# Patient Record
Sex: FEMALE | Race: WHITE | Employment: UNEMPLOYED | URBAN - NONMETROPOLITAN AREA
[De-identification: names, ages, dates, MRNs, and addresses within clinical notes are randomized per-mention and may not be internally consistent; named-entity substitution may affect disease eponyms.]

---

## 2023-10-15 ENCOUNTER — HOSPITAL ENCOUNTER (EMERGENCY)
Age: 63
Discharge: HOME | End: 2023-10-15
Attending: STUDENT IN AN ORGANIZED HEALTH CARE EDUCATION/TRAINING PROGRAM
Payer: COMMERCIAL

## 2023-10-15 ENCOUNTER — APPOINTMENT (OUTPATIENT)
Dept: CT IMAGING | Age: 63
End: 2023-10-15
Payer: COMMERCIAL

## 2023-10-15 ENCOUNTER — APPOINTMENT (OUTPATIENT)
Dept: GENERAL RADIOLOGY | Age: 63
End: 2023-10-15
Payer: COMMERCIAL

## 2023-10-15 VITALS
RESPIRATION RATE: 19 BRPM | TEMPERATURE: 98.1 F | SYSTOLIC BLOOD PRESSURE: 132 MMHG | OXYGEN SATURATION: 91 % | DIASTOLIC BLOOD PRESSURE: 64 MMHG | WEIGHT: 250 LBS | BODY MASS INDEX: 40.18 KG/M2 | HEART RATE: 91 BPM | HEIGHT: 66 IN

## 2023-10-15 DIAGNOSIS — F41.1 ANXIETY STATE: Primary | ICD-10-CM

## 2023-10-15 LAB
ANION GAP SERPL CALC-SCNC: 11 MEQ/L (ref 8–16)
BASOPHILS ABSOLUTE: 0 THOU/MM3 (ref 0–0.1)
BASOPHILS NFR BLD AUTO: 0.8 %
BUN SERPL-MCNC: 17 MG/DL (ref 7–22)
CALCIUM SERPL-MCNC: 9.6 MG/DL (ref 8.5–10.5)
CHLORIDE SERPL-SCNC: 100 MEQ/L (ref 98–111)
CO2 SERPL-SCNC: 25 MEQ/L (ref 23–33)
CREAT SERPL-MCNC: 0.6 MG/DL (ref 0.4–1.2)
DEPRECATED RDW RBC AUTO: 41.6 FL (ref 35–45)
EOSINOPHIL NFR BLD AUTO: 2.2 %
EOSINOPHILS ABSOLUTE: 0.1 THOU/MM3 (ref 0–0.4)
ERYTHROCYTE [DISTWIDTH] IN BLOOD BY AUTOMATED COUNT: 12.7 % (ref 11.5–14.5)
GFR SERPL CREATININE-BSD FRML MDRD: > 60 ML/MIN/1.73M2
GLUCOSE SERPL-MCNC: 93 MG/DL (ref 70–108)
HCT VFR BLD AUTO: 46.8 % (ref 37–47)
HGB BLD-MCNC: 15.2 GM/DL (ref 12–16)
IMM GRANULOCYTES # BLD AUTO: 0.02 THOU/MM3 (ref 0–0.07)
IMM GRANULOCYTES NFR BLD AUTO: 0.3 %
LYMPHOCYTES ABSOLUTE: 1.1 THOU/MM3 (ref 1–4.8)
LYMPHOCYTES NFR BLD AUTO: 18.1 %
MCH RBC QN AUTO: 29.3 PG (ref 26–33)
MCHC RBC AUTO-ENTMCNC: 32.5 GM/DL (ref 32.2–35.5)
MCV RBC AUTO: 90.3 FL (ref 81–99)
MONOCYTES ABSOLUTE: 0.4 THOU/MM3 (ref 0.4–1.3)
MONOCYTES NFR BLD AUTO: 6.6 %
NEUTROPHILS NFR BLD AUTO: 72 %
NRBC BLD AUTO-RTO: 0 /100 WBC
OSMOLALITY SERPL CALC.SUM OF ELEC: 273.2 MOSMOL/KG (ref 275–300)
PLATELET # BLD AUTO: 218 THOU/MM3 (ref 130–400)
PMV BLD AUTO: 10.1 FL (ref 9.4–12.4)
POTASSIUM SERPL-SCNC: 4.3 MEQ/L (ref 3.5–5.2)
RBC # BLD AUTO: 5.18 MILL/MM3 (ref 4.2–5.4)
SEGMENTED NEUTROPHILS ABSOLUTE COUNT: 4.3 THOU/MM3 (ref 1.8–7.7)
SODIUM SERPL-SCNC: 136 MEQ/L (ref 135–145)
TROPONIN, HIGH SENSITIVITY: < 6 NG/L (ref 0–12)
TSH SERPL DL<=0.005 MIU/L-ACNC: 3.1 UIU/ML (ref 0.4–4.2)
WBC # BLD AUTO: 6 THOU/MM3 (ref 4.8–10.8)

## 2023-10-15 PROCEDURE — 84443 ASSAY THYROID STIM HORMONE: CPT

## 2023-10-15 PROCEDURE — 93005 ELECTROCARDIOGRAM TRACING: CPT | Performed by: STUDENT IN AN ORGANIZED HEALTH CARE EDUCATION/TRAINING PROGRAM

## 2023-10-15 PROCEDURE — 85025 COMPLETE CBC W/AUTO DIFF WBC: CPT

## 2023-10-15 PROCEDURE — 36415 COLL VENOUS BLD VENIPUNCTURE: CPT

## 2023-10-15 PROCEDURE — 6360000004 HC RX CONTRAST MEDICATION: Performed by: STUDENT IN AN ORGANIZED HEALTH CARE EDUCATION/TRAINING PROGRAM

## 2023-10-15 PROCEDURE — 99285 EMERGENCY DEPT VISIT HI MDM: CPT

## 2023-10-15 PROCEDURE — 84484 ASSAY OF TROPONIN QUANT: CPT

## 2023-10-15 PROCEDURE — 71260 CT THORAX DX C+: CPT

## 2023-10-15 PROCEDURE — 71045 X-RAY EXAM CHEST 1 VIEW: CPT

## 2023-10-15 PROCEDURE — 6370000000 HC RX 637 (ALT 250 FOR IP): Performed by: STUDENT IN AN ORGANIZED HEALTH CARE EDUCATION/TRAINING PROGRAM

## 2023-10-15 PROCEDURE — 80048 BASIC METABOLIC PNL TOTAL CA: CPT

## 2023-10-15 RX ORDER — LORAZEPAM 1 MG/1
1 TABLET ORAL ONCE
Status: COMPLETED | OUTPATIENT
Start: 2023-10-15 | End: 2023-10-15

## 2023-10-15 RX ORDER — TRIAMTERENE AND HYDROCHLOROTHIAZIDE 37.5; 25 MG/1; MG/1
1 CAPSULE ORAL EVERY MORNING
COMMUNITY

## 2023-10-15 RX ORDER — HYDROXYZINE HYDROCHLORIDE 25 MG/1
25 TABLET, FILM COATED ORAL 3 TIMES DAILY PRN
COMMUNITY

## 2023-10-15 RX ORDER — TRAZODONE HYDROCHLORIDE 100 MG/1
100 TABLET ORAL NIGHTLY
COMMUNITY

## 2023-10-15 RX ADMIN — LORAZEPAM 1 MG: 1 TABLET ORAL at 11:59

## 2023-10-15 RX ADMIN — IOPAMIDOL 80 ML: 755 INJECTION, SOLUTION INTRAVENOUS at 13:57

## 2023-10-15 ASSESSMENT — PAIN - FUNCTIONAL ASSESSMENT: PAIN_FUNCTIONAL_ASSESSMENT: NONE - DENIES PAIN

## 2023-10-15 NOTE — ED NOTES
Pt rprts feeling a bit better following medications; \"less jittery. \" Pt to and from x-ray w/rad tech. Placed back on the monitor. Vitals updated. Call light within reach.       Yomi Singh, [address] [place] [address]  10/15/23 [id number]

## 2023-10-15 NOTE — ED NOTES
Pt to and from bathroom. Gait steady. Pt updated on new orders. Medicated per order. EKG obtained. Pt denies any CP/SOB. Vitals updated. Call light remains within reach.       Diana Summers  10/15/23 [id number]

## 2023-10-15 NOTE — DISCHARGE INSTRUCTIONS
Please continue taking all of your medications as directed. As you reviewed if you have any worsening chest pain, shortness of breath or recurrent symptoms not treated with your home medication do not hesitate to come back to the emergency department.

## 2023-10-15 NOTE — ED TRIAGE NOTES
Pt to ED via private vehicle w/spouse and rprts of waking up feeling anxious and jittery. Rprts forgot to take all meds yesterday, but did take them today. Rprts not realizing that she can take vistaril 3 times a day and has only intermittently been taking once a day. Pt denies chest pain or SOB. Pt A&O x4. Breathing easy and unlabored on RA. Denies SI/HI at this time. Vitals updated. Call light remains within reach.

## 2023-10-15 NOTE — ED PROVIDER NOTES
[facility] EMERGENCY DEPT    EMERGENCY MEDICINE      Pt Name: Lala Godinez  MRN: [id number]  [address] [facility] 1960  Date of evaluation: 10/15/2023  Treating Physician: Kylee Quintanilla DO    CHIEF COMPLAINT       Chief Complaint   Patient presents with    Anxiety     History obtained from chart review and the patient. HISTORY OF PRESENT ILLNESS    HPI    Lala Godinez is a 60 y.o. female presents to the emergency department for evaluation of anxiousness. Patient states she woke up this morning feeling very uneasy and jittery. States history of anxiety for which she takes Vistaril. Has been only taking intermittently at most once per day. Feels a warm sensation and flushing sensation throughout her body. Patient began crying and states that she does not know why she wants to cry but feels like she needs to. Denies any chest pain, change in bowel or bladder habits. When her feelings get bad she does note associated shortness of breath. Denies any history of thyroid abnormalities. Does note that her and family member bedside of recently stopped consuming as much caffeine and cut down her soda intake. The patient has no other acute complaints at this time.       PAST MEDICAL AND SURGICAL HISTORY     Past Medical History:   Diagnosis Date    Diabetes ([address])     Hypertension        Past Surgical History:   Procedure Laterality Date    COLONOSCOPY  2010    [id number] [facility]       CURRENT MEDICATIONS     Discharge Medication List as of 10/15/2023  3:00 PM        CONTINUE these medications which have NOT CHANGED    Details   triamterene-hydroCHLOROthiazide (DYAZIDE) 37.5-25 MG per capsule Take 1 capsule by mouth every morningHistorical Med      hydrOXYzine HCl (ATARAX) 25 MG tablet Take 1 tablet by mouth 3 times daily as needed for ItchingHistorical Med      traZODone (DESYREL) 100 MG tablet Take 1 tablet by mouth nightlyHistorical Med      simvastatin (ZOCOR) 40 MG tablet Take 1

## 2023-10-16 LAB
EKG ATRIAL RATE: 83 BPM
EKG P AXIS: 67 DEGREES
EKG P-R INTERVAL: 172 MS
EKG Q-T INTERVAL: 364 MS
EKG QRS DURATION: 88 MS
EKG QTC CALCULATION (BAZETT): 427 MS
EKG R AXIS: 8 DEGREES
EKG T AXIS: 41 DEGREES
EKG VENTRICULAR RATE: 83 BPM

## 2023-10-16 PROCEDURE — 93010 ELECTROCARDIOGRAM REPORT: CPT | Performed by: INTERNAL MEDICINE

## 2024-09-15 LAB
ADENOVIRUS DNA QUALITITIVE BY RT PCR: NOT DETECTED
BORDETELLA PARAPERTUSSIS BY PCR: NOT DETECTED
BORDETELLA PERTUSSIS PCR: NOT DETECTED
CHLAMYDIA PNEUMONIAE: NOT DETECTED
HCOV 229E RNA SPEC QL NAA+PROBE: NOT DETECTED
HCOV HKU1 RNA SPEC QL NAA+PROBE: NOT DETECTED
HCOV NL63 RNA SPEC QL NAA+PROBE: NOT DETECTED
HCOV OC43 RNA SPEC QL NAA+PROBE: NOT DETECTED
HUMAN METAPNEUMOVIRUS PCR: DETECTED
INFLUENZA A H1 BY PCR: NOT DETECTED
INFLUENZA A H1-2009 BY PCR: NOT DETECTED
INFLUENZA A H3 BY PCR: NOT DETECTED
INFLUENZA VIRUS A RNA: NOT DETECTED
INFLUENZA VIRUS B RNA: NOT DETECTED
MYCOPLASMA PNEUMONIAE PCR: NOT DETECTED
PARAINFLUENZA 1 PCR: NOT DETECTED
PARAINFLUENZA 2 PCR: NOT DETECTED
PARAINFLUENZA 3 PCR: NOT DETECTED
PARAINFLUENZA 4 PCR: NOT DETECTED
RESPIRATORY SYNCYTIAL VIRUS: NOT DETECTED
RV+EV RNA SPEC QL NAA+PROBE: NOT DETECTED
SARS-COV-2: NOT DETECTED

## 2024-09-16 LAB — PROCALCITONIN: 0.07 NG/ML

## 2024-10-13 NOTE — PROGRESS NOTES
[place] [facility]  Pulmonary medicine clinic initial consult note.        Patient: Rain Kumar  : 1960      Chief complaint/[place]:     Rain Kumar is a 63 y.o. old female came for further evaluation regarding her exertional dyspnea started after having human Jackman pneumo virus pneumonia in 2024.  She was initially hospitalized for 3 to 4 days at [facility] and was discharged home.  Due to worsening of her symptoms she came back to the [facility] ER and was transferred to [facility].  She stayed at [facility] 3 to 4 days and was subsequently discharged home.  She was discharged home with a home oxygen at 1 L/min.  She is currently not using any home oxygen.     She was referred by BOO Tracy NP     She was also suspected to have a bronchial asthma/COPD.    She is currently taking following inhalers:  Albuterol HFA 2 puffs every 6 hourly as needed  Singulair 10 mg p.o. nightly  Symbicort 160/4.52 puffs twice daily    She was evaluated at the [facility] emergency room on 2023.    She is having shortness of breath:Yes  Onset: Gradual  Duration: For the last 2 years  Diurnal variation:  None.    Functional status prior to beginning of symptoms: Distance she can walk on level ground: 1 mile on level ground  Current functional capacity on level ground: Distance she can walk on level ground: Less than 1 block on level ground  She can climb steps: Yes  Flights of steps she can climb: 2 steps at home.  She gives a history of orthopnea:Yes.  She is currently using 1 pillow.  She gives a history of paroxysmal nocturnal dyspnea:Yes     She is having cough: No    Associated with fever: No    She was ever diagnosed with following pulmonary diseases:  Bronchial asthma:No.   COPD:No.   Pulmonary fibrosis:No.   Sarcoidosis:No.   Pulmonary embolism: No.   Bronchiectasis:No.

## 2024-10-24 ENCOUNTER — OFFICE VISIT (OUTPATIENT)
Dept: PULMONOLOGY | Age: 64
End: 2024-10-24
Payer: COMMERCIAL

## 2024-10-24 VITALS
BODY MASS INDEX: 45.39 KG/M2 | DIASTOLIC BLOOD PRESSURE: 72 MMHG | SYSTOLIC BLOOD PRESSURE: 142 MMHG | HEART RATE: 84 BPM | TEMPERATURE: 98.1 F | WEIGHT: 289.2 LBS | OXYGEN SATURATION: 92 % | HEIGHT: 67 IN

## 2024-10-24 DIAGNOSIS — R06.01 ORTHOPNEA: ICD-10-CM

## 2024-10-24 DIAGNOSIS — R06.00 DYSPNEA, UNSPECIFIED TYPE: ICD-10-CM

## 2024-10-24 DIAGNOSIS — R93.89 ABNORMAL CT OF THE CHEST: ICD-10-CM

## 2024-10-24 DIAGNOSIS — J84.10 PULMONARY FIBROSIS (HCC): Primary | ICD-10-CM

## 2024-10-24 DIAGNOSIS — G47.30 SLEEP APNEA, UNSPECIFIED TYPE: ICD-10-CM

## 2024-10-24 DIAGNOSIS — R06.00 PND (PAROXYSMAL NOCTURNAL DYSPNEA): ICD-10-CM

## 2024-10-24 DIAGNOSIS — J18.9 PNEUMONIA OF BOTH LOWER LOBES DUE TO INFECTIOUS ORGANISM: ICD-10-CM

## 2024-10-24 PROCEDURE — 99205 OFFICE O/P NEW HI 60 MIN: CPT | Performed by: INTERNAL MEDICINE

## 2024-10-24 PROCEDURE — 3017F COLORECTAL CA SCREEN DOC REV: CPT | Performed by: INTERNAL MEDICINE

## 2024-10-24 PROCEDURE — G8419 CALC BMI OUT NRM PARAM NOF/U: HCPCS | Performed by: INTERNAL MEDICINE

## 2024-10-24 PROCEDURE — 1036F TOBACCO NON-USER: CPT | Performed by: INTERNAL MEDICINE

## 2024-10-24 PROCEDURE — G8484 FLU IMMUNIZE NO ADMIN: HCPCS | Performed by: INTERNAL MEDICINE

## 2024-10-24 PROCEDURE — G8427 DOCREV CUR MEDS BY ELIG CLIN: HCPCS | Performed by: INTERNAL MEDICINE

## 2024-10-24 RX ORDER — BUDESONIDE AND FORMOTEROL FUMARATE DIHYDRATE 160; 4.5 UG/1; UG/1
AEROSOL RESPIRATORY (INHALATION)
COMMUNITY
Start: 2024-09-25

## 2024-10-24 RX ORDER — ALBUTEROL SULFATE 90 UG/1
2 INHALANT RESPIRATORY (INHALATION) EVERY 6 HOURS PRN
COMMUNITY
Start: 2024-09-25

## 2024-10-24 RX ORDER — FLUTICASONE PROPIONATE 50 MCG
2 SPRAY, SUSPENSION (ML) NASAL DAILY
COMMUNITY
Start: 2024-09-25

## 2024-10-24 RX ORDER — ATORVASTATIN CALCIUM 10 MG/1
10 TABLET, FILM COATED ORAL NIGHTLY
COMMUNITY

## 2024-10-24 NOTE — PATIENT INSTRUCTIONS
ecommendations/Plan:  -Schedule patient for full pulmonary function tests before clinic visit.  -Schedule patient for Methacholine challenge test before clinic visit  to further evaluate for hyperreactive air way disease before clinic visit.  -Will send Expectorated sputum for Eosinophils.  -Please schedule patient for follow up at [facility] sleep clinic with earliest available provider for further evaluation and management of sleep apnea.  She underwent a sleep study in 2023 at [facility] sleep lab.  Need to obtain her old sleep study reports for the sleep clinic consultation.  -Schedule patient for Cardiology consult with Dr. Mc Taylor MD as soon as possible for further evaluation of ?  Etiology of exertional dyspnea for the last 2 years to rule out diastolic congestive heart failure as an etiology.  She gave a history of orthopnea and paroxysmal nocturnal dyspnea.  -She was advised to loose weight by controlling diet and doing exercise once cleared by her Cardiologist.  -Rain Kumar was advised to make early appointment with my clinic if she develops any constitutional symptoms including loss of weight, poor appetite or hemoptysis. She verbalizes under standing.  -She was advised to continue to use home oxygen 2 L/min with exercise.  No home oxygen needed at rest.  She was diagnosed with obstructive sleep apnea and currently waiting for therapy.  -Continue albuterol 2 puffs every 6 hourly as needed. She was informed about adverse effects of Albuterol HFA. She verbalizes understanding.  -Continue Symbicort 160/4.52 puffs twice daily. She was informed about adverse effects of Symbicort. She verbalizes understanding.  -Continue Singulair 10 mg p.o. nightly. She was informed about adverse effects of Singulair. She verbalizes understanding.   Patient was educated regarding worsening of depression side effect with the Singulair.  Patient denies any recent worsening of depression.  She was advised to stop

## 2024-10-24 NOTE — PROGRESS NOTES
Neck Circumference -   15 inches  Mallampati -  1    Lung Nodule Screening     [] Qualifies    [x] Does not qualify   [] Declined    [] Completed

## 2024-12-14 NOTE — PROGRESS NOTES
[facility]  Sleep Medicine Clinic initial consultation note. /She is an established patient of my pulmonary clinic [facility]. She came for Sleep medicine evaluation today. She was seen by me for the last time on 24 October 2024      Rain Kumar                                                Chief complaint: Rain Kumar is a 64 y.o.oldfemale came for further evaluation regarding her sleep apnea  with referral from Faheem Peguero,*.    Patient underwent sleep test at [facility] sleep lab in the past.       [place]:    Sleep/Wake schedule:  Usual time to go to bed during the work/regular day of week: 9 PM to 10 PM  Usual time to wake up during the work//regular day of week : 6 AM  Over the weekends her sleep schedule:  [x]phase delayed.  She usually falls a sleep in less than: 10 minutes  She takes naps: Yes.   Number of naps per week: 4 times  During each nap she spends a total of: Approximately 60 minutes  The naps were reported as refreshing: Yes- some times    Sleep Hygiene:    Is the temperature and evironment in her bed room is acceptable to her: Yes.   She watches Television in her bed room: Yes.  She read books, study, pay bills etc in the bed: No.  Frequency She wake up during night/sleep: 2  Majority of nocturnal awakenings are for urination: Yes.    Difficulty in falling back to sleep after nocturnal awakenings: Yes- some times.    Do you drink coffee: No.   Do you drink caffeinated beverages i.e sodas: Yes.  She drinks about 2 cans of Dr. Pepper per day.  She drinks her last caffeinated soda around 5 PM.  Do you drink tea: Yes.      Do you drink alcoholic beverages: No.      History of recreational drug use: No.     Social History     Tobacco Use    Smoking status: Never    Smokeless tobacco: Never   Substance Use Topics    Alcohol use: No    Drug use: No       Sleep apnea symptoms:    Shewas diagnosed with sleep

## 2024-12-16 ENCOUNTER — TELEPHONE (OUTPATIENT)
Dept: PULMONOLOGY | Age: 64
End: 2024-12-16

## 2024-12-16 NOTE — TELEPHONE ENCOUNTER
Attempted to verify patient's insurance to start prior authorization request for CT chest high resolution scheduled for 12/31/24 at [facility].  [employer] for patient no longer verifies.    Phoned patient to update insurance.  Patient stated her [employer] ended but was not sure what it changed to.  Informed patient to call insurance to figure out plan and member ID number.  Patient stated she would call office back with this information.

## 2024-12-30 NOTE — TELEPHONE ENCOUNTER
SECOND ATTEMPT    Phoned patient to see if she has received updated insurance information yet and patient stated she has not.  Patient stated she would call office to update once she receives new insurance information.

## 2025-01-09 ENCOUNTER — OFFICE VISIT (OUTPATIENT)
Dept: PULMONOLOGY | Age: 65
End: 2025-01-09
Payer: MEDICARE

## 2025-01-09 VITALS
HEART RATE: 74 BPM | HEIGHT: 67 IN | DIASTOLIC BLOOD PRESSURE: 64 MMHG | BODY MASS INDEX: 44.8 KG/M2 | SYSTOLIC BLOOD PRESSURE: 126 MMHG | TEMPERATURE: 98.2 F | WEIGHT: 285.4 LBS | OXYGEN SATURATION: 92 %

## 2025-01-09 DIAGNOSIS — G47.10 HYPERSOMNIA: ICD-10-CM

## 2025-01-09 DIAGNOSIS — G47.33 OSA TREATED WITH BIPAP: Primary | ICD-10-CM

## 2025-01-09 PROCEDURE — 99215 OFFICE O/P EST HI 40 MIN: CPT | Performed by: INTERNAL MEDICINE

## 2025-01-09 PROCEDURE — 1123F ACP DISCUSS/DSCN MKR DOCD: CPT | Performed by: INTERNAL MEDICINE

## 2025-01-09 PROCEDURE — G8400 PT W/DXA NO RESULTS DOC: HCPCS | Performed by: INTERNAL MEDICINE

## 2025-01-09 PROCEDURE — 1036F TOBACCO NON-USER: CPT | Performed by: INTERNAL MEDICINE

## 2025-01-09 PROCEDURE — 1090F PRES/ABSN URINE INCON ASSESS: CPT | Performed by: INTERNAL MEDICINE

## 2025-01-09 PROCEDURE — G8417 CALC BMI ABV UP PARAM F/U: HCPCS | Performed by: INTERNAL MEDICINE

## 2025-01-09 PROCEDURE — 3017F COLORECTAL CA SCREEN DOC REV: CPT | Performed by: INTERNAL MEDICINE

## 2025-01-09 PROCEDURE — G8427 DOCREV CUR MEDS BY ELIG CLIN: HCPCS | Performed by: INTERNAL MEDICINE

## 2025-01-09 RX ORDER — LOSARTAN POTASSIUM 25 MG/1
25 TABLET ORAL DAILY
COMMUNITY

## 2025-01-09 RX ORDER — ASPIRIN 81 MG/1
81 TABLET ORAL DAILY
COMMUNITY

## 2025-01-09 RX ORDER — DAPAGLIFLOZIN 10 MG/1
10 TABLET, FILM COATED ORAL
COMMUNITY
Start: 2024-12-17 | End: 2025-03-17

## 2025-01-09 RX ORDER — METOPROLOL SUCCINATE 25 MG/1
25 TABLET, EXTENDED RELEASE ORAL
COMMUNITY
Start: 2024-12-17 | End: 2025-03-17

## 2025-01-09 NOTE — PROGRESS NOTES
Chief Complaint: Sleep consult for severe sleep apnea, referred by Dr Peguero, PSG 6/27/21, titration 10/22/21, no prior echos, not currently using PAP machine.     Mallampati airway Class: 2  Neck Circumference: 14.5 inches    [place] sleepiness score 1/9/25: ***  SAQLI: ***

## 2025-01-10 ENCOUNTER — HOSPITAL ENCOUNTER (OUTPATIENT)
Dept: CT IMAGING | Age: 65
Discharge: HOME | End: 2025-01-10
Attending: RADIOLOGY

## 2025-01-10 ENCOUNTER — HOSPITAL ENCOUNTER (OUTPATIENT)
Dept: GENERAL RADIOLOGY | Age: 65
Discharge: HOME | End: 2025-01-10

## 2025-01-10 DIAGNOSIS — Z00.6 ENCOUNTER FOR EXAMINATION FOR NORMAL COMPARISON OR CONTROL IN CLINICAL RESEARCH PROGRAM: ICD-10-CM

## 2025-01-15 DIAGNOSIS — R06.00 DYSPNEA, UNSPECIFIED TYPE: ICD-10-CM

## 2025-01-16 NOTE — TELEPHONE ENCOUNTER
Patient in office on 1/9/25 for appointment with Dr Peguero.    Patient's insurance updated at check in.    Patient now has Medicare A & B.  No prior authorization needed for CT chest high resolution.

## 2025-02-19 ENCOUNTER — TELEPHONE (OUTPATIENT)
Dept: PULMONOLOGY | Age: 65
End: 2025-02-19

## 2025-02-19 DIAGNOSIS — G47.33 OSA TREATED WITH BIPAP: ICD-10-CM

## 2025-02-19 DIAGNOSIS — G47.10 HYPERSOMNIA: ICD-10-CM

## 2025-02-19 NOTE — PROGRESS NOTES
[place] [facility]  Sleep Medicine Clinic Follow up note      Rain Kumar                                            Chief Complaint and [place]: Rain Kumar is a 64 y.o.oldfemale came for follow up regarding her sleep study results. She underwent sleep study on***. She still complaining of excessive day time sleepiness with no improvement compared to last visit.      Review of Systems:   General/Constitutional: she gained/lost {NUMBERS 0-20:73078} lbs of weight from the last visit with normal appetite. No fever or chills.  HENT: Negative.   Eyes: Negative.  Upper respiratory tract: No nasal stuffiness or post nasal drip.  Lower respiratory tract/ lungs: No cough or sputum production. No hemoptysis.  Cardiovascular: No palpitations or chest pain.  Gastrointestinal: No nausea or vomiting.  Neurological: No focal neurologiacal weakness.  Extremities: No edema.  Musculoskeletal: No complaints.  Genitourinary: No complaints.  Hematological: Negative.   Psychiatric/Behavioral: Negative.   Skin: No itching.        Past Medical History:   Diagnosis Date    Diabetes (HCC)     Hypertension     Pneumonia 09/2024       Past Surgical History:   Procedure Laterality Date    COLONOSCOPY  2010    GALLBLADDER SURGERY      TUBAL LIGATION  1984       Social History     Tobacco Use    Smoking status: Never    Smokeless tobacco: Never   Substance Use Topics    Alcohol use: No    Drug use: No       No Known Allergies    Current Outpatient Medications   Medication Sig Dispense Refill    losartan (COZAAR) 25 MG tablet Take 1 tablet by mouth daily      aspirin 81 MG EC tablet Take 1 tablet by mouth daily      FARXIGA 10 MG tablet Take 1 tablet by mouth      metoprolol succinate (TOPROL XL) 25 MG extended release tablet Take 1 tablet by mouth      albuterol sulfate HFA (PROVENTIL;VENTOLIN;PROAIR) 108 (90 Base) MCG/ACT inhaler Inhale 2 puffs into the lungs every 6 hours as needed      fluticasone

## 2025-02-19 NOTE — TELEPHONE ENCOUNTER
Called and LM for pt to call to make a Sleep F/U.  She had a study on 2/13 and did not split.  She needs an appt.  Does not appear that she had a sleep F/U just Pulm.

## 2025-02-20 ENCOUNTER — OFFICE VISIT (OUTPATIENT)
Dept: PULMONOLOGY | Age: 65
End: 2025-02-20
Payer: MEDICARE

## 2025-02-20 VITALS
OXYGEN SATURATION: 98 % | WEIGHT: 278.8 LBS | HEIGHT: 67 IN | HEART RATE: 71 BPM | BODY MASS INDEX: 43.76 KG/M2 | SYSTOLIC BLOOD PRESSURE: 126 MMHG | DIASTOLIC BLOOD PRESSURE: 60 MMHG | TEMPERATURE: 97.5 F

## 2025-02-20 DIAGNOSIS — G47.33 OSA TREATED WITH BIPAP: ICD-10-CM

## 2025-02-20 DIAGNOSIS — G47.33 OSA (OBSTRUCTIVE SLEEP APNEA): Primary | ICD-10-CM

## 2025-02-20 PROCEDURE — G8427 DOCREV CUR MEDS BY ELIG CLIN: HCPCS | Performed by: INTERNAL MEDICINE

## 2025-02-20 PROCEDURE — 1123F ACP DISCUSS/DSCN MKR DOCD: CPT | Performed by: INTERNAL MEDICINE

## 2025-02-20 PROCEDURE — 1036F TOBACCO NON-USER: CPT | Performed by: INTERNAL MEDICINE

## 2025-02-20 PROCEDURE — G8400 PT W/DXA NO RESULTS DOC: HCPCS | Performed by: INTERNAL MEDICINE

## 2025-02-20 PROCEDURE — 99213 OFFICE O/P EST LOW 20 MIN: CPT | Performed by: INTERNAL MEDICINE

## 2025-02-20 PROCEDURE — 1090F PRES/ABSN URINE INCON ASSESS: CPT | Performed by: INTERNAL MEDICINE

## 2025-02-20 PROCEDURE — 3017F COLORECTAL CA SCREEN DOC REV: CPT | Performed by: INTERNAL MEDICINE

## 2025-02-20 PROCEDURE — G8417 CALC BMI ABV UP PARAM F/U: HCPCS | Performed by: INTERNAL MEDICINE

## 2025-02-20 NOTE — PROGRESS NOTES
Chief Complaint: 1 month sleep follow up with PSG 2/13/25 (patient did not qualify to split).     Mallampati airway Class: 2  Neck Circumference: 14.5 inches     [place] sleepiness score 2/20/25: ***  SAQLI: ***

## 2025-02-20 NOTE — PATIENT INSTRUCTIONS
Recommendations/Plan:  -I had a discussion with patient regarding avialable treatment options for her sleep disorder breathing including but not limited to going back on BiPAP with pressures of 23/17 cm of water Vs.other available surgical options including maxillomandibularostomy, Inspire device placement (she need to lose weight to keep her BMI less than 35 before proceeding for inspire device referral) and tracheostomy as last option. At the end of discussion, she is not decided on any treatment at this time.  She is going to call my office once she decided to go for treatment of her choice.  -She is aware of the consequences of her decision including increased risk for cardiovascular and cerebrovascular events and morbidity including death.  -She was advised to continue to practice good sleep hygiene practices.  -She was advised to loose weight by controlling diet and doing exercise.  -She was instructed to not to drive any motor vehicles or operate heavy equipment if she feels sleepy.   -Schedule patient for follow up with my clinic on PRN/As needed basis for sleep related issues. Patient to follow with his family physician closely.   -Rain Kumar was educated about my impression and plan. She verbalizes understanding.

## 2025-02-20 NOTE — PROGRESS NOTES
[place] [facility]  Sleep Medicine Clinic Follow up note      Rain Kumar                                            Chief Complaint and [place]: Rain Kumar is a 64 y.o.oldfemale came for follow up regarding her sleep study results. She underwent sleep study on2/13/2025. She still complaining of excessive day time sleepiness with no improvement compared to last visit.    She endorses residual symptoms of daytime sleepiness, morning dry mouth, as well as frequent un-restorative sleep and feeling lethargic / low energy throughout the day.     Recent sleep study on 2/13/2025 revealed severe ANKIT. Patient made aware of results but has tried BiPAP before and does not think she'll be able to tolerate any CPAP or BiPAP.     Review of Systems:   General/Constitutional: she lost 7 lbs of weight from the last visit. No fever or chills.  HENT: Negative.   Eyes: Negative.  Upper respiratory tract: No nasal stuffiness or post nasal drip.  Lower respiratory tract/ lungs: Occasional cough with sputum production.  Cardiovascular: No palpitations or chest pain.  Gastrointestinal: No nausea or vomiting.  Neurological: No focal neurologiacal weakness.  Extremities: No edema.  Musculoskeletal: No complaints.  Genitourinary: No complaints.  Hematological: Negative.   Psychiatric/Behavioral: Negative.   Skin: No itching.      Past Medical History:   Diagnosis Date    Diabetes (HCC)     Hypertension     Pneumonia 09/2024       Past Surgical History:   Procedure Laterality Date    COLONOSCOPY  2010    GALLBLADDER SURGERY      TUBAL LIGATION  1984       Social History     Tobacco Use    Smoking status: Never    Smokeless tobacco: Never   Vaping Use    Vaping status: Never Used   Substance Use Topics    Alcohol use: No    Drug use: No       No Known Allergies    Current Outpatient Medications   Medication Sig Dispense Refill    losartan (COZAAR) 25 MG tablet Take 1 tablet by mouth daily      aspirin 81

## 2025-02-24 ENCOUNTER — TELEPHONE (OUTPATIENT)
Dept: PULMONOLOGY | Age: 65
End: 2025-02-24

## 2025-02-24 DIAGNOSIS — G47.10 HYPERSOMNIA: ICD-10-CM

## 2025-02-24 DIAGNOSIS — G47.33 OSA (OBSTRUCTIVE SLEEP APNEA): Primary | ICD-10-CM

## 2025-02-24 NOTE — TELEPHONE ENCOUNTER
Pt called back in reference to her therapy for sleep apnea. She is wanting to try the Inspire. Please advise